# Patient Record
Sex: FEMALE | Race: OTHER | HISPANIC OR LATINO | URBAN - METROPOLITAN AREA
[De-identification: names, ages, dates, MRNs, and addresses within clinical notes are randomized per-mention and may not be internally consistent; named-entity substitution may affect disease eponyms.]

---

## 2019-04-24 ENCOUNTER — EMERGENCY (EMERGENCY)
Facility: HOSPITAL | Age: 24
LOS: 1 days | Discharge: HOME | End: 2019-04-24
Attending: EMERGENCY MEDICINE | Admitting: EMERGENCY MEDICINE
Payer: COMMERCIAL

## 2019-04-24 VITALS
TEMPERATURE: 99 F | HEART RATE: 101 BPM | SYSTOLIC BLOOD PRESSURE: 130 MMHG | DIASTOLIC BLOOD PRESSURE: 66 MMHG | OXYGEN SATURATION: 99 % | RESPIRATION RATE: 19 BRPM | WEIGHT: 175.05 LBS | HEIGHT: 68 IN

## 2019-04-24 DIAGNOSIS — L50.0 ALLERGIC URTICARIA: ICD-10-CM

## 2019-04-24 DIAGNOSIS — Z88.0 ALLERGY STATUS TO PENICILLIN: ICD-10-CM

## 2019-04-24 DIAGNOSIS — T78.2XXA ANAPHYLACTIC SHOCK, UNSPECIFIED, INITIAL ENCOUNTER: ICD-10-CM

## 2019-04-24 DIAGNOSIS — Z88.1 ALLERGY STATUS TO OTHER ANTIBIOTIC AGENTS STATUS: ICD-10-CM

## 2019-04-24 LAB
HCG SERPL-ACNC: <0 MIU/ML — SIGNIFICANT CHANGE UP
HIV 1+2 AB+HIV1 P24 AG SERPL QL IA: SIGNIFICANT CHANGE UP

## 2019-04-24 PROCEDURE — 99285 EMERGENCY DEPT VISIT HI MDM: CPT

## 2019-04-24 RX ORDER — FAMOTIDINE 10 MG/ML
20 INJECTION INTRAVENOUS ONCE
Qty: 0 | Refills: 0 | Status: COMPLETED | OUTPATIENT
Start: 2019-04-24 | End: 2019-04-24

## 2019-04-24 RX ORDER — DIPHENHYDRAMINE HCL 50 MG
50 CAPSULE ORAL ONCE
Qty: 0 | Refills: 0 | Status: COMPLETED | OUTPATIENT
Start: 2019-04-24 | End: 2019-04-24

## 2019-04-24 RX ORDER — HYDROXYZINE HCL 10 MG
25 TABLET ORAL ONCE
Qty: 0 | Refills: 0 | Status: COMPLETED | OUTPATIENT
Start: 2019-04-24 | End: 2019-04-24

## 2019-04-24 RX ORDER — EPINEPHRINE 0.3 MG/.3ML
0.3 INJECTION INTRAMUSCULAR; SUBCUTANEOUS ONCE
Qty: 0 | Refills: 0 | Status: COMPLETED | OUTPATIENT
Start: 2019-04-24 | End: 2019-04-24

## 2019-04-24 RX ADMIN — EPINEPHRINE 0.3 MILLIGRAM(S): 0.3 INJECTION INTRAMUSCULAR; SUBCUTANEOUS at 21:46

## 2019-04-24 RX ADMIN — FAMOTIDINE 20 MILLIGRAM(S): 10 INJECTION INTRAVENOUS at 21:49

## 2019-04-24 RX ADMIN — Medication 125 MILLIGRAM(S): at 21:46

## 2019-04-24 RX ADMIN — Medication 50 MILLIGRAM(S): at 21:47

## 2019-04-24 NOTE — ED ADULT NURSE NOTE - OBJECTIVE STATEMENT
Pt states "I am having an allergic reaction but I don't know to what. It started 1 hr ago. I have itching, hives all over and my throat feels weird. I also feel a little anxious with SOB".  Urticaria noted throughout A/P thorax, BBS CTA, no stridor noted, pt. reports feeling of "throat swelling, and trouble taking a deep breath," bl alvin-orbital erythema/edema noted

## 2019-04-24 NOTE — ED PROVIDER NOTE - CLINICAL SUMMARY MEDICAL DECISION MAKING FREE TEXT BOX
24F pmh allergic reaction to minocycline, occasional exaerbation w/ hives, facial swelling, since feb. Today 1hr pt noted sudden onset R lip swelling, then diffuse face w/ itching and raised rash to chest and neck, throat swelling, worse than prior episodes. Had taken zyrtec at initial onset w/o improvement, thus came here. Never filled prior epipen rx, thus did not use. Exam noted for obvious diffuse lip, orbital and facial swelling, urticarial rash to neck and upper chest. No raspy voice, no tongue swelling. Given epi, benadryl, solumedrol, pepcid. 24F pmh allergic reaction to minocycline, occasional exaerbation w/ hives, facial swelling, since feb. Today 1hr pt noted sudden onset R lip swelling, then diffuse face w/ itching and raised rash to chest and neck, throat swelling, worse than prior episodes. Had taken zyrtec at initial onset w/o improvement, thus came here. Never filled prior epipen rx, thus did not use. Exam noted for obvious diffuse lip, orbital and facial swelling, urticarial rash to neck and upper chest. No raspy voice, no tongue swelling. Given epi, benadryl, solumedrol, pepcid. Concern anaphylaxis, allergic reaction, no anaphylactic shock.

## 2019-04-24 NOTE — ED ADULT TRIAGE NOTE - CHIEF COMPLAINT QUOTE
Pt states "I am having an allergic reaction but I don't know to what. It started 1 hr ago. I have itching, hives all over and my throat feels weird. I also feel a little anxious with SOB".

## 2019-04-24 NOTE — ED PROVIDER NOTE - PHYSICAL EXAMINATION
VITAL SIGNS: I have reviewed nursing notes and confirm.  CONSTITUTIONAL: Well-developed; well-nourished; in moderate distress,   SKIN: Skin is warm and dry,  urticarial rash to neck and upper chest.   HEAD: Normocephalic; atraumatic.  EYES:  EOM intact; conjunctiva and sclera clear.  ENT: No nasal discharge; airway clear. Diffuse lip, orbital and facial swelling, No raspy voice, no tongue swelling.  NECK: Supple; Voluntary FROM  CARD: No rubs appreciated, Regular rate and rhythm.  RESP: No wheezes, no rales. No respiratory distress  ABD: Soft; non-distended; non-tender; no rebound or guarding  EXT: Normal ROM. No cyanosis or edema.  NEURO: Alert, oriented. Grossly unremarkable.  PSYCH: Cooperative, appropriate.

## 2019-04-24 NOTE — ED ADULT NURSE NOTE - CHPI ED NUR SYMPTOMS POS
DIFFICULTY BREATHING/SHORTNESS OF BREATH/THROAT ITCHING/HIVES/ITCHING/SWELLING OF FACE, TONGUE/RASH/REDNESS/ANXIETY/DIFFICULTY SWALLOWING

## 2019-04-24 NOTE — ED ADULT NURSE NOTE - NSIMPLEMENTINTERV_GEN_ALL_ED
Implemented All Universal Safety Interventions:  [place] to call system. Call bell, personal items and telephone within reach. Instruct patient to call for assistance. Room bathroom lighting operational. Non-slip footwear when patient is off stretcher. Physically safe environment: no spills, clutter or unnecessary equipment. Stretcher in lowest position, wheels locked, appropriate side rails in place.

## 2019-04-24 NOTE — ED PROVIDER NOTE - NSFOLLOWUPINSTRUCTIONS_ED_ALL_ED_FT
Immediately return to the Emergency Department or call 911 for any high fever, trouble breathing, severe vomiting, worsening pain, or any other concerns.    Allergic Reaction    An allergic reaction is an abnormal reaction to a substance (allergen) by the body's defense system. Common allergens include medicines, food, insect bites or stings, and blood products. The body releases certain proteins into the blood that can cause a variety of symptoms such as an itchy rash, wheezing, swelling of the face/lips/tongue/throat, abdominal pain, nausea or vomiting. An allergic reaction is usually treated with medication. If your health care provider prescribed you an epinephrine injection device, make sure to keep it with you at all times.    SEEK IMMEDIATE MEDICAL CARE IF YOU HAVE ANY OF THE FOLLOWING SYMPTOMS: allergic reaction severe enough that required you to use epinephrine, tightness in your chest, swelling around your lips/tongue/throat, abdominal pain, vomiting or diarrhea, or lightheadedness/dizziness. These symptoms may represent a serious problem that is an emergency. Do not wait to see if the symptoms will go away. Use your auto-injector pen or anaphylaxis kit as you have been instructed. Call 911 and do not drive yourself to the hospital.

## 2019-04-24 NOTE — ED ADULT TRIAGE NOTE - ARRIVAL INFO ADDITIONAL COMMENTS
Pt noted with generalize hives, swelling to lips and eyes. No drooling, no swelling of tongue, no swelling of uvula. Pt able to swallow without difficulty. Speaking in full sentences.

## 2019-04-25 VITALS
RESPIRATION RATE: 18 BRPM | DIASTOLIC BLOOD PRESSURE: 68 MMHG | HEART RATE: 74 BPM | TEMPERATURE: 98 F | OXYGEN SATURATION: 97 % | SYSTOLIC BLOOD PRESSURE: 120 MMHG

## 2019-04-25 PROCEDURE — 96375 TX/PRO/DX INJ NEW DRUG ADDON: CPT

## 2019-04-25 PROCEDURE — 99284 EMERGENCY DEPT VISIT MOD MDM: CPT | Mod: 25

## 2019-04-25 PROCEDURE — 84702 CHORIONIC GONADOTROPIN TEST: CPT

## 2019-04-25 PROCEDURE — 96374 THER/PROPH/DIAG INJ IV PUSH: CPT

## 2019-04-25 PROCEDURE — 87389 HIV-1 AG W/HIV-1&-2 AB AG IA: CPT

## 2019-04-25 PROCEDURE — 96372 THER/PROPH/DIAG INJ SC/IM: CPT | Mod: XU

## 2019-04-25 PROCEDURE — 36415 COLL VENOUS BLD VENIPUNCTURE: CPT

## 2019-04-25 RX ORDER — EPINEPHRINE 0.3 MG/.3ML
0.3 INJECTION INTRAMUSCULAR; SUBCUTANEOUS
Qty: 1 | Refills: 0 | OUTPATIENT
Start: 2019-04-25

## 2019-04-25 RX ADMIN — Medication 25 MILLIGRAM(S): at 00:03

## 2019-04-25 NOTE — ED ADULT NURSE REASSESSMENT NOTE - NS ED NURSE REASSESS COMMENT FT1
interventions were implemented as noted, genevieve. well, assessment on-going, connected to continuous Pulse Ox monitoring, utd on poc, with understanding verbalized
pt. ambulated to restroom and back to bed with steady gait noted, without incident, continuous pulse ox monitoring continued
pt. reports itching has subsided, nad, assessment on-going
pt. states feeling much better already, erythema/edema to face and a/p thorax dissipating, vs remain stable, assessment on-going
pt. states starting to itch again in her face, denies complaint or change otherwise, provider notified, orders received/implemented as noted, assessment on-going
sitting up in bed, nad, resps. even/unlabored, assessment on-going, pending further orders
Patient breathing easy on room air. No reports of dyspnea or SOB.

## 2020-07-02 NOTE — ED PROVIDER NOTE - OBJECTIVE STATEMENT
24F pmh allergic reaction to minocycline, occasional exacerbation w/ hives, facial swelling, since feb. Today 1hr pt noted sudden onset R lip swelling, then diffuse face w/ itching and raised rash to chest and neck, throat swelling, worse than prior episodes. Had taken zyrtec at initial onset w/o improvement, thus came here. Never filled prior epipen rx, thus did not use. No new rashes, detergents, lotions, medications, foods. No hx of intubation.   Had prior testing for allergies w/ only minocycline reaction via blood test. [facility]

## 2024-11-08 NOTE — ED ADULT NURSE NOTE - CHIEF COMPLAINT QUOTE
NA Pt states "I am having an allergic reaction but I don't know to what. It started 1 hr ago. I have itching, hives all over and my throat feels weird. I also feel a little anxious with SOB".